# Patient Record
Sex: MALE | Race: BLACK OR AFRICAN AMERICAN
[De-identification: names, ages, dates, MRNs, and addresses within clinical notes are randomized per-mention and may not be internally consistent; named-entity substitution may affect disease eponyms.]

---

## 2017-05-16 ENCOUNTER — HOSPITAL ENCOUNTER (EMERGENCY)
Dept: HOSPITAL 61 - ER | Age: 47
Discharge: HOME | End: 2017-05-16
Payer: SELF-PAY

## 2017-05-16 VITALS — SYSTOLIC BLOOD PRESSURE: 176 MMHG | DIASTOLIC BLOOD PRESSURE: 106 MMHG

## 2017-05-16 VITALS — BODY MASS INDEX: 29.62 KG/M2 | WEIGHT: 200 LBS | HEIGHT: 69 IN

## 2017-05-16 DIAGNOSIS — M10.9: ICD-10-CM

## 2017-05-16 DIAGNOSIS — I10: ICD-10-CM

## 2017-05-16 DIAGNOSIS — M79.644: Primary | ICD-10-CM

## 2017-05-16 DIAGNOSIS — M79.89: ICD-10-CM

## 2017-05-16 PROCEDURE — 99283 EMERGENCY DEPT VISIT LOW MDM: CPT

## 2017-05-16 NOTE — PHYS DOC
Past Medical History


Past Medical History:  Hypertension


Additional Past Medical Histor:  Gout


Past Surgical History:  No Surgical History


Alcohol Use:  Heavy


Drug Use:  None





Adult General


Chief Complaint


Chief Complaint:  FINGER INJURY





HPI


HPI





Patient is a 46  year old male who presents with right middle finger pain and 

swelling at his PIP that started approx 5 days ago.  Has a history of gout; 

usually in his great toe.  No injury known.  Denies f/c, numbness, tingling, 

weakness.  


States he has been using ice and started ibuprofen today.  He states his 

symptoms are improving, but he was told by family to come for evaluation. 

States he is not on any preventative gout medications.  He also notes chronic 

uncontrolled HTN and states he is not here for evaluation of that.





Review of Systems


Review of Systems





Constitutional: Denies fever or chills []


Eyes: Denies change in visual acuity, redness, or eye pain []


HENT: Denies nasal congestion or sore throat []


Respiratory: Denies cough or shortness of breath []


Cardiovascular: No additional information not addressed in HPI []


GI: Denies abdominal pain, nausea, vomiting, bloody stools or diarrhea []


: Denies dysuria or hematuria []


Musculoskeletal: Denies back pain []


Integument: Denies rash or skin lesions []


Neurologic: Denies headache, focal weakness or sensory changes []


Endocrine: Denies polyuria or polydipsia []





Allergies


Allergies





Allergies








Coded Allergies Type Severity Reaction Last Updated Verified


 


  No Known Drug Allergies    9/24/15 No











Physical Exam


Physical Exam





Constitutional: Well developed, well nourished, no acute distress, non-toxic 

appearance. []


HENT: Normocephalic, atraumatic, bilateral external ears normal, oropharynx 

moist, nose normal. []


Eyes: PERRLA, EOMI. [] 


Neck: Normal range of motion, supple. [] 


Cardiovascular:Heart rate regular rhythm []


Lungs & Thorax:  Bilateral breath sounds clear to auscultation []


Abdomen: Bowel sounds normal, soft, no tenderness. [] 


Skin: Warm, dry, no erythema, no rash. [] 


Back: Normal ROM. [] 


Extremities: RUE with swelling and mild rubor to 2nd PIP with local mild 

tenderness; skin intact;  Flexion at middle finger PIP reduced due to swelling 

but can range it; Can make fist/ok sign/thumb up/finger cross and spread; Can 

flex/ex wrist; Good radial pulse and brisk cap refill equal bilaterally; 

sensation intact to light touch m/u/r nerves; No extensor lag or rotational 

deformity 


Neurologic: Alert and oriented X 3, normal motor function, normal sensory 

function, no focal deficits noted. []


Psychologic: Affect normal, judgement normal, mood normal. []





Current Patient Data


Vital Signs





 Vital Signs








  Date Time  Temp Pulse Resp B/P (MAP) Pulse Ox O2 Delivery O2 Flow Rate FiO2


 


5/16/17 19:21 97.6 99 18 190/120 (143) 98 Room Air  





 97.6       











Course & Med Decision Making


Course & Med Decision Making


Discussed supportive care for likely gouty flair.  Encouraged etoh cessation 

and diet modification to help decrease gout flairs.  Also encouraged follow up 

with his PCP for uncontrolled chronic comorbidities.  Return precautions given.

  He understands and agrees with plan.





Dragon Disclaimer


Dragon Disclaimer


This electronic medical record was generated, in whole or in part, using a 

voice recognition dictation system.





Departure


Departure


Impression:  


 Primary Impression:  


 Finger pain, right


Disposition:  01 HOME, SELF-CARE


Condition:  STABLE


Referrals:  


NO PCP (PCP)


Patient Instructions:  Gout, Easy-to-Read





Additional Instructions:  


Take ibuprofen 400mg 3 times per day to help with pain and swelling.  Follow up 

with your primary care doctor.  Return for any concerns.


Scripts


Ibuprofen (IBUPROFEN) 200 Mg Tablet


200 MG PO PRN Q6HRS Y for INFLAMMATION, #60 TAB


   Prov: REAL RANGEL MD         5/16/17











REAL RANGEL MD May 16, 2017 19:36

## 2022-01-05 ENCOUNTER — HOSPITAL ENCOUNTER (EMERGENCY)
Dept: HOSPITAL 61 - ER | Age: 52
LOS: 1 days | Discharge: HOME | End: 2022-01-06
Payer: SELF-PAY

## 2022-01-05 VITALS — HEIGHT: 70 IN | BODY MASS INDEX: 32.98 KG/M2 | WEIGHT: 230.38 LBS

## 2022-01-05 DIAGNOSIS — F17.200: ICD-10-CM

## 2022-01-05 DIAGNOSIS — I10: ICD-10-CM

## 2022-01-05 DIAGNOSIS — F10.229: Primary | ICD-10-CM
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
## 2022-01-05 PROCEDURE — 36415 COLL VENOUS BLD VENIPUNCTURE: CPT

## 2022-01-05 PROCEDURE — 81001 URINALYSIS AUTO W/SCOPE: CPT

## 2022-01-05 PROCEDURE — 96365 THER/PROPH/DIAG IV INF INIT: CPT

## 2022-01-05 PROCEDURE — 96366 THER/PROPH/DIAG IV INF ADDON: CPT

## 2022-01-05 PROCEDURE — 82962 GLUCOSE BLOOD TEST: CPT

## 2022-01-05 PROCEDURE — 80307 DRUG TEST PRSMV CHEM ANLYZR: CPT

## 2022-01-05 PROCEDURE — 96375 TX/PRO/DX INJ NEW DRUG ADDON: CPT

## 2022-01-05 PROCEDURE — 85025 COMPLETE CBC W/AUTO DIFF WBC: CPT

## 2022-01-05 PROCEDURE — 80053 COMPREHEN METABOLIC PANEL: CPT

## 2022-01-05 PROCEDURE — 99285 EMERGENCY DEPT VISIT HI MDM: CPT

## 2022-01-06 VITALS — SYSTOLIC BLOOD PRESSURE: 168 MMHG | DIASTOLIC BLOOD PRESSURE: 90 MMHG

## 2022-01-06 LAB
ALBUMIN SERPL-MCNC: 3.7 G/DL (ref 3.4–5)
ALBUMIN/GLOB SERPL: 0.8 {RATIO} (ref 1–1.7)
ALP SERPL-CCNC: 83 U/L (ref 46–116)
ALT SERPL-CCNC: 22 U/L (ref 16–63)
AMPHETAMINE/METHAMPHETAMINE: (no result)
ANION GAP SERPL CALC-SCNC: 13 MMOL/L (ref 6–14)
ANISOCYTOSIS BLD QL SMEAR: (no result)
APTT PPP: YELLOW S
AST SERPL-CCNC: 28 U/L (ref 15–37)
BACTERIA #/AREA URNS HPF: 0 /HPF
BARBITURATES UR-MCNC: (no result) UG/ML
BASOPHILS # BLD AUTO: 0.1 X10^3/UL (ref 0–0.2)
BASOPHILS NFR BLD: 1 % (ref 0–3)
BENZODIAZ UR-MCNC: (no result) UG/L
BILIRUB SERPL-MCNC: 0.3 MG/DL (ref 0.2–1)
BILIRUB UR QL STRIP: NEGATIVE
BUN SERPL-MCNC: 41 MG/DL (ref 8–26)
BUN/CREAT SERPL: 23 (ref 6–20)
CALCIUM SERPL-MCNC: 8.3 MG/DL (ref 8.5–10.1)
CANNABINOIDS UR-MCNC: (no result) UG/L
CHLORIDE SERPL-SCNC: 101 MMOL/L (ref 98–107)
CO2 SERPL-SCNC: 20 MMOL/L (ref 21–32)
COCAINE UR-MCNC: (no result) NG/ML
CREAT SERPL-MCNC: 1.8 MG/DL (ref 0.7–1.3)
EOSINOPHIL NFR BLD: 0.2 X10^3/UL (ref 0–0.7)
EOSINOPHIL NFR BLD: 3 % (ref 0–3)
ERYTHROCYTE [DISTWIDTH] IN BLOOD BY AUTOMATED COUNT: 20.4 % (ref 11.5–14.5)
FIBRINOGEN PPP-MCNC: (no result) MG/DL
GFR SERPLBLD BASED ON 1.73 SQ M-ARVRAT: 48.4 ML/MIN
GLUCOSE SERPL-MCNC: 89 MG/DL (ref 70–99)
HCT VFR BLD CALC: 35.6 % (ref 39–53)
HGB BLD-MCNC: 11.9 G/DL (ref 13–17.5)
HYALINE CASTS #/AREA URNS LPF: (no result) /HPF
LYMPHOCYTES # BLD: 2.5 X10^3/UL (ref 1–4.8)
LYMPHOCYTES NFR BLD AUTO: 42 % (ref 24–48)
MCH RBC QN AUTO: 32 PG (ref 25–35)
MCHC RBC AUTO-ENTMCNC: 34 G/DL (ref 31–37)
MCV RBC AUTO: 94 FL (ref 79–100)
METHADONE SERPL-MCNC: (no result) NG/ML
MONO #: 0.4 X10^3/UL (ref 0–1.1)
MONOCYTES NFR BLD: 7 % (ref 0–9)
NEUT #: 2.8 X10^3/UL (ref 1.8–7.7)
NEUTROPHILS NFR BLD AUTO: 48 % (ref 31–73)
NITRITE UR QL STRIP: NEGATIVE
OPIATES UR-MCNC: (no result) NG/ML
PCP SERPL-MCNC: (no result) MG/DL
PH UR STRIP: 5.5 [PH]
PLATELET # BLD AUTO: 339 X10^3/UL (ref 140–400)
PLATELET # BLD EST: ADEQUATE 10*3/UL
POLYCHROMASIA BLD QL SMEAR: SLIGHT
POTASSIUM SERPL-SCNC: 4.9 MMOL/L (ref 3.5–5.1)
PROT SERPL-MCNC: 8.4 G/DL (ref 6.4–8.2)
PROT UR STRIP-MCNC: 100 MG/DL
RBC # BLD AUTO: 3.8 X10^6/UL (ref 4.3–5.7)
RBC #/AREA URNS HPF: 0 /HPF (ref 0–2)
SODIUM SERPL-SCNC: 134 MMOL/L (ref 136–145)
UROBILINOGEN UR-MCNC: 0.2 MG/DL
WBC # BLD AUTO: 5.9 X10^3/UL (ref 4–11)
WBC #/AREA URNS HPF: 0 /HPF (ref 0–4)

## 2022-01-06 RX ADMIN — METHYLPREDNISOLONE SODIUM SUCCINATE ONE MG: 125 INJECTION, POWDER, FOR SOLUTION INTRAMUSCULAR; INTRAVENOUS at 09:01

## 2022-01-06 RX ADMIN — FOLIC ACID ONE MLS/HR: 5 INJECTION, SOLUTION INTRAMUSCULAR; INTRAVENOUS; SUBCUTANEOUS at 01:40

## 2022-01-06 NOTE — PHYS DOC
Past Medical History


Past Medical History:  Hypertension


Additional Past Medical Histor:  Gout


 (KRISTINA PUGH)


Past Surgical History:  No Surgical History


 (KRISTINA PUGH)


Smoking Status:  Current Every Day Smoker


Alcohol Use:  Heavy


Drug Use:  None


 (KRISTINA PUGH)





General Adult


EDM:


Chief Complaint:  ALCOHOL INTOXICATION





HPI:


HPI:


Patient is a 51-year-old male who presents to the emergency department today via

EMS after being found in a parking lot intoxicated.  EMS reports that they tried

to get patient to ambulate but he was unable to ambulate with assistance and 

therefore was transported to the emergency department for evaluation.  Patient 

is alert and oriented x4.  He states that he drank "a lot of alcohol".  Patient 

reports that he drank beer but cannot estimate how much.  Patient is tearful 

while in the emergency department but has no complaints.  He is emotionally 

upset over his family life.  He states that he does not talk to his children and

struggles financially.  Patient states that he has no desire for detox from 

alcohol use.


 (KRISTINA PUGH)





Review of Systems:


Review of Systems:


14 body systems of the review of systems have been reviewed.  See HPI for 

pertinent positive and negative responses, otherwise all other systems are 

negative, nonpertinent or noncontributory


 (KRISTINA PUGH)





Heart Score:


C/O Chest Pain:  No


Risk Factors:


Risk Factors:  DM, Current or recent (<one month) smoker, HTN, HLP, family 

history of CAD, obesity.


Risk Scores:


Score 0 - 3:  2.5% MACE over next 6 weeks - Discharge Home


Score 4 - 6:  20.3% MACE over next 6 weeks - Admit for Clinical Observation


Score 7 - 10:  72.7% MACE over next 6 weeks - Early Invasive Strategies


 (KRISTINA PUGH)





Allergies:


Allergies:





Allergies








Coded Allergies Type Severity Reaction Last Updated Verified


 


  No Known Drug Allergies    9/24/15 No








 (KRISTINA PUGH)





Physical Exam:


PE:





Constitutional: Well developed, well nourished, no acute distress, non-toxic 

appearance. []


HENT: Normocephalic, atraumatic, bilateral external ears normal, oropharynx 

moist, no oral exudates, nose normal. []


Eyes: PERRL, EOMI, conjunctiva normal, no discharge. [] 


Neck: Normal range of motion, no stridor


Cardiovascular:Heart rate regular rhythm, no murmur []


Lungs & Thorax:  Bilateral breath sounds clear to auscultation []


Abdomen: Bowel sounds normal, soft, no tenderness, no masses, no pulsatile 

masses. [] 


Skin: Warm, dry, no erythema, no rash. [] 


Back: Normal range of motion


Extremities: No tenderness, no cyanosis, no clubbing, ROM intact, no edema. [] 


Neurologic: Alert and oriented X 3, normal motor function, normal sensory 

function, no focal deficits noted. []


Psychologic: Affect normal, judgement normal, mood normal. []


 (KRISTINA PUGH)





EKG:


EKG:


[]


 (KRISTINA PUGH)





Radiology/Procedures:


Radiology/Procedures:


[]


 (KRISTINA PUGH)





Course & Med Decision Making:


Course & Med Decision Making


Pertinent Labs and Imaging studies reviewed. (See chart for details)





[] Patient presents to the emergency department for alcohol intoxication.  

Patient cannot quantify how much alcohol he did have to drink but does report 

drinking "a lot of beer".  Patient has no complaints at this time other than 

being emotionally upset.  He denies any chest pain or shortness of breath.  

Patient's blood pressure is mildly elevated in the emergency department but he 

has no chest pain or shortness of breath.  He states that he has hypertension 

but has not taken his medications because at his cousin's house. Patient states 

that he lives by himself, he is able to tell me his address. He reports that 

there is no one that he can call to take him home. Patient was fed in the ER. 

Patient was ambulatory with little assistance. Patient states that he "wants 

rehab". Patient states that he "needs to go to rehab". He admits being an 

alcoholic. He reports that he has gone to rehab before and has detoxed 

previously, he denies any seizure hx with withdrawal. 


Lab work ordered for medical clearance for rehab discharge. Patient to be 

evaluated by psychiatric assessment team. 0121. I discussed patients case with 

supervising physician and she will assume patient care at this time due to shift

 change. 


 (KRISTINA PUGH)


Course & Med Decision Making


This patient was initially seen by the nurse practitioner.  I assumed care at 

0100.  The patient is significantly intoxicated with alcohol, he denies any 

physical pain or discomfort.  He requested services for detox. The PAT team came

 to see him, but he was too intoxicated can to converse meaningfully.  He will 

be reassessed later this morning to see if he still desires to have detox 

resources and services or not.  The PAT team will reevaluate him at the time he 

is more clinically sober, alert and awake and conversant.  He has no complaints,

 he has been resting comfortably, manifested evidence of distress since my 

assumption of care.  Dr. Jackson will be taking over the patient as of 0600.


 (DEANNA FERGUSON DO)


Course & Med Decision Making


Patient is a a 51-year-old male who present to ER for evaluation of alcohol 

intoxication.  Patient alcohol level was elevated.  Patient was observed in ER 

for 9 hours and 30 minutes.  Patient was awake alert this time, denies suicidal 

ideation denies homicidal ideation.  Patient interested in detox program so he 

can go to.  Patient is medically clear.  Patient was given [facility] 

alcohol drug assessment center information so he can follow-up. 





Patient said when he woke up this morning the corner of the left lower lip 

swollen, he is not sure what happened.





I examined the patient, he has little swelling of the left lower lip, no tongue 

swelling, no trouble breathing, no trouble swallowing.





Patient was given Benadryl and steroid with Pepcid in the ER for possible 

allergic reaction.  Patient ate in no acute distress.  Patient will be 

discharged  from the ER


 (MJ JACKSON DO)


Dragon Disclaimer:


Dragon Disclaimer:


This electronic medical record was generated, in whole or in part, using a voice

 recognition dictation system.


 (KRISTINA PUGH)





Departure


Departure


Impression:  


   Primary Impression:  


   Alcohol intoxication


   Qualified Codes:  F10.920 - Alcohol use, unspecified with intoxication, 

   uncomplicated


Disposition:  01 HOME / SELF CARE / HOMELESS


Condition:  GOOD


Referrals:  


NO PCP (PCP)


Patient Instructions:  Alcohol Problems





Additional Instructions:  


Please follow up with  [facility] for alcohol detox treatment if you need. 





[facility]


Phone #[phone] or 3 [phone] [address] [place] [address].





[email or web address]











KRISTINA PUGH           Jan 6, 2022 00:20


DEANNA FERGUSON DO              Jan 6, 2022 08:06


MJ JACKSON DO                 Jan 6, 2022 09:32